# Patient Record
Sex: FEMALE
[De-identification: names, ages, dates, MRNs, and addresses within clinical notes are randomized per-mention and may not be internally consistent; named-entity substitution may affect disease eponyms.]

---

## 2023-01-15 ENCOUNTER — RX ONLY (OUTPATIENT)
Age: 58
Setting detail: RX ONLY
End: 2023-01-15

## 2023-02-21 ENCOUNTER — APPOINTMENT (RX ONLY)
Dept: URBAN - METROPOLITAN AREA CLINIC 101 | Facility: CLINIC | Age: 58
Setting detail: DERMATOLOGY
End: 2023-02-21

## 2023-02-21 DIAGNOSIS — Z41.9 ENCOUNTER FOR PROCEDURE FOR PURPOSES OTHER THAN REMEDYING HEALTH STATE, UNSPECIFIED: ICD-10-CM

## 2023-02-21 PROCEDURE — ? COUNSELING - EXPLANTATION

## 2023-02-21 PROCEDURE — ? CONSULTATION - MASTOPEXY

## 2023-02-24 ENCOUNTER — RX ONLY (OUTPATIENT)
Age: 58
Setting detail: RX ONLY
End: 2023-02-24

## 2023-02-24 ENCOUNTER — APPOINTMENT (RX ONLY)
Dept: URBAN - METROPOLITAN AREA CLINIC 101 | Facility: CLINIC | Age: 58
Setting detail: DERMATOLOGY
End: 2023-02-24

## 2023-02-24 DIAGNOSIS — Z41.9 ENCOUNTER FOR PROCEDURE FOR PURPOSES OTHER THAN REMEDYING HEALTH STATE, UNSPECIFIED: ICD-10-CM

## 2023-02-24 PROCEDURE — ? ORDER TESTS

## 2023-02-24 PROCEDURE — ? PRE-OP WORKLIST

## 2023-02-24 RX ORDER — PROMETHAZINE HYDROCHLORIDE 12.5 MG/1
TABLET ORAL
Qty: 20 | Refills: 1 | Status: ERX | COMMUNITY
Start: 2023-02-24

## 2023-02-24 RX ORDER — CEPHALEXIN 500 MG/1
CAPSULE ORAL
Qty: 28 | Refills: 2 | Status: ERX | COMMUNITY
Start: 2023-02-24

## 2023-02-24 RX ORDER — OXYCODONE HYDROCHLORIDE AND ACETAMINOPHEN 5; 325 MG/1; MG/1
TABLET ORAL
Qty: 30 | Refills: 0 | Status: ERX | COMMUNITY
Start: 2023-02-24

## 2023-02-24 RX ORDER — CYCLOBENZAPRINE HYDROCHLORIDE 5 MG/1
TABLET, FILM COATED ORAL
Qty: 15 | Refills: 1 | Status: ERX | COMMUNITY
Start: 2023-02-24

## 2023-02-24 NOTE — PROCEDURE: PRE-OP WORKLIST
Admission Status: outpatient
Surgery Scheduled: Explant, Lift
Date Of Surgery: 3/14/23
Photos Taken?: no
Detail Level: Simple
Estimated Length Of Stay: 1-2 Hours
Recovery Facility: [facility]
Surgeon: Anand
Coordination With:: Adeola

## 2023-03-14 ENCOUNTER — APPOINTMENT (RX ONLY)
Dept: URBAN - METROPOLITAN AREA CLINIC 101 | Facility: CLINIC | Age: 58
Setting detail: DERMATOLOGY
End: 2023-03-14

## 2023-03-14 DIAGNOSIS — Z41.9 ENCOUNTER FOR PROCEDURE FOR PURPOSES OTHER THAN REMEDYING HEALTH STATE, UNSPECIFIED: ICD-10-CM

## 2023-03-14 PROCEDURE — ? OPERATIVE NOTE - BRIEF

## 2023-03-14 NOTE — PROCEDURE: OPERATIVE NOTE - BRIEF
Additional Epidermal Closure: horizontal mattress
Estimated Blood Loss (Cc): minimal
Position: supine
Detail Level: Detailed
Surgeon: Dr. Edwardo Whiting

## 2023-03-15 ENCOUNTER — APPOINTMENT (RX ONLY)
Dept: URBAN - METROPOLITAN AREA CLINIC 101 | Facility: CLINIC | Age: 58
Setting detail: DERMATOLOGY
End: 2023-03-15

## 2023-03-15 DIAGNOSIS — Z41.9 ENCOUNTER FOR PROCEDURE FOR PURPOSES OTHER THAN REMEDYING HEALTH STATE, UNSPECIFIED: ICD-10-CM

## 2023-03-15 PROCEDURE — 99024 POSTOP FOLLOW-UP VISIT: CPT

## 2023-03-15 PROCEDURE — ? COUNSELING - POST-OP CHECK

## 2023-03-20 ENCOUNTER — APPOINTMENT (RX ONLY)
Dept: URBAN - METROPOLITAN AREA CLINIC 101 | Facility: CLINIC | Age: 58
Setting detail: DERMATOLOGY
End: 2023-03-20

## 2023-03-20 DIAGNOSIS — Z41.9 ENCOUNTER FOR PROCEDURE FOR PURPOSES OTHER THAN REMEDYING HEALTH STATE, UNSPECIFIED: ICD-10-CM

## 2023-03-20 PROCEDURE — ? PHOTOS OBTAINED

## 2023-03-20 PROCEDURE — ? DRAIN REMOVAL

## 2023-04-25 ENCOUNTER — APPOINTMENT (RX ONLY)
Dept: URBAN - METROPOLITAN AREA CLINIC 101 | Facility: CLINIC | Age: 58
Setting detail: DERMATOLOGY
End: 2023-04-25

## 2023-09-26 ENCOUNTER — APPOINTMENT (RX ONLY)
Dept: URBAN - METROPOLITAN AREA CLINIC 101 | Facility: CLINIC | Age: 58
Setting detail: DERMATOLOGY
End: 2023-09-26

## 2024-04-16 ENCOUNTER — APPOINTMENT (RX ONLY)
Dept: URBAN - METROPOLITAN AREA CLINIC 101 | Facility: CLINIC | Age: 59
Setting detail: DERMATOLOGY
End: 2024-04-16